# Patient Record
Sex: MALE | Race: WHITE | NOT HISPANIC OR LATINO | URBAN - METROPOLITAN AREA
[De-identification: names, ages, dates, MRNs, and addresses within clinical notes are randomized per-mention and may not be internally consistent; named-entity substitution may affect disease eponyms.]

---

## 2017-05-22 ENCOUNTER — TELEPHONE (OUTPATIENT)
Dept: FAMILY MEDICINE | Facility: CLINIC | Age: 27
End: 2017-05-22

## 2017-05-22 NOTE — TELEPHONE ENCOUNTER
----- Message from Jane Torres sent at 2017  3:19 PM CDT -----  Contact: Self  Lee Devine  MRN: [id number]  : 1990  PCP: Primary Doctor No  Home Phone      [phone]  Work Phone      Not on file.  Mobile          Not on file.      MESSAGE:   Patient would like to get a PSP test done and states that he has the orders. Please return call @ [phone]. Thanks.

## 2017-05-23 ENCOUNTER — HOSPITAL ENCOUNTER (OUTPATIENT)
Dept: PULMONOLOGY | Facility: HOSPITAL | Age: 27
Discharge: HOME | End: 2017-05-23
Attending: NURSE PRACTITIONER
Payer: COMMERCIAL

## 2017-05-23 DIAGNOSIS — Z02.1 PRE-EMPLOYMENT HEALTH SCREENING EXAMINATION: Primary | ICD-10-CM

## 2017-05-23 PROCEDURE — 94010 BREATHING CAPACITY TEST: CPT

## 2019-04-17 ENCOUNTER — TELEPHONE (OUTPATIENT)
Dept: FAMILY MEDICINE | Facility: CLINIC | Age: 29
End: 2019-04-17

## 2019-04-17 NOTE — TELEPHONE ENCOUNTER
----- Message from Reddy Barlow sent at 2019  3:38 PM CDT -----  Contact: Patient  Lee Devine  MRN: [id number]  : 1990  PCP: Primary Doctor No  Home Phone      [phone]  Work Phone      Not on file.  Mobile          Not on file.      MESSAGE: new patient -- needs whooping cough vaccine -- can he get it here -- please advise    Call [phone]    PCP: ?????

## 2019-08-02 ENCOUNTER — TELEPHONE (OUTPATIENT)
Dept: FAMILY MEDICINE | Facility: CLINIC | Age: 29
End: 2019-08-02

## 2019-08-02 NOTE — TELEPHONE ENCOUNTER
Can any Dr do a [place] citizen physical? Pt will bring papers by to see if we can complete physical.   Pt will be new to our clinic and needs this done for work.

## 2019-08-02 NOTE — TELEPHONE ENCOUNTER
----- Message from Reddy Barlow sent at 2019 10:19 AM CDT -----  Contact: Patient  Lee Devine  MRN: [id number]  : 1990  PCP: Primary Doctor No  Home Phone      [phone]  Work Phone      Not on file.  Mobile          Not on file.      MESSAGE: new patient -- needs work physical - states something about [facility] -- will bring paperwork by to make sure Dr Can fill it out -- requesting appt with any Dr, before 19    Call [phone]    PCP: ?????

## 2019-08-06 NOTE — TELEPHONE ENCOUNTER
PT stated he is willing to see any Dr in our area, so sent it to every Dr. With in [facility] and [facility].  Informed pt to bring physical form to [facility] to make sure one of our providers can do the physical he needs, pt verbally understood